# Patient Record
Sex: MALE | Race: WHITE
[De-identification: names, ages, dates, MRNs, and addresses within clinical notes are randomized per-mention and may not be internally consistent; named-entity substitution may affect disease eponyms.]

---

## 2019-12-20 ENCOUNTER — HOSPITAL ENCOUNTER (OUTPATIENT)
Dept: HOSPITAL 61 - KCIC MRI | Age: 54
Discharge: HOME | End: 2019-12-20
Attending: FAMILY MEDICINE
Payer: COMMERCIAL
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
## 2019-12-20 DIAGNOSIS — S56.911A: Primary | ICD-10-CM

## 2019-12-20 DIAGNOSIS — M77.8: ICD-10-CM

## 2019-12-20 PROCEDURE — 73221 MRI JOINT UPR EXTREM W/O DYE: CPT

## 2019-12-23 NOTE — [facility]
MR of the right elbow

 

HISTORY: Right elbow pain anteriorly. Lifting injury.

 

TECHNIQUE: Routine multiplanar sequences are obtained.

 

FINDINGS:

Mild motion degradation.

 

The biceps and brachialis tendons are intact. Triceps tendon is intact.

 

Common flexor tendon is intact. Mild thickening and signal within the 

proximal ulnar collateral ligament, likely exaggerated by motion 

degradation although sprain or degeneration is possible. No high-grade 

tear or rupture is seen however.

 

Common extensor tendon demonstrates mild thickening and heterogeneous 

signal compatible with tendinosis. No high-grade tear. Lateral collateral 

ligament complex is intact.

 

Minimal subchondral marrow edema signal at the anterior capitellum, could 

be degenerative or a small marrow contusion. No aggressive bone 

destruction. No evidence of acute fracture. Ulnar nerve appears 

unremarkable. No acute soft tissue abnormality.

 

IMPRESSION:

1. Mild common extensor tendinosis.

2. Mild thickening and signal within the proximal ulnar collateral 

ligament, may just be due to motion degradation, although a mild sprain or

degeneration is possible.

3. Minimal subchondral marrow edema at the anterior capitellum, could be 

degenerative or small marrow contusion.

 

Electronically signed by: Evaristo Monsivais MD (12/23/2019 9:50 AM) [id number]